# Patient Record
Sex: FEMALE | Race: WHITE | NOT HISPANIC OR LATINO | Employment: STUDENT | URBAN - METROPOLITAN AREA
[De-identification: names, ages, dates, MRNs, and addresses within clinical notes are randomized per-mention and may not be internally consistent; named-entity substitution may affect disease eponyms.]

---

## 2023-11-05 ENCOUNTER — HOSPITAL ENCOUNTER (EMERGENCY)
Facility: HOSPITAL | Age: 18
Discharge: HOME | End: 2023-11-05
Attending: EMERGENCY MEDICINE
Payer: MEDICAID

## 2023-11-05 VITALS
WEIGHT: 113.13 LBS | HEIGHT: 64 IN | RESPIRATION RATE: 16 BRPM | BODY MASS INDEX: 19.31 KG/M2 | SYSTOLIC BLOOD PRESSURE: 126 MMHG | DIASTOLIC BLOOD PRESSURE: 78 MMHG | OXYGEN SATURATION: 99 % | TEMPERATURE: 98 F | HEART RATE: 82 BPM

## 2023-11-05 DIAGNOSIS — S00.81XA ABRASION OF FACE, INITIAL ENCOUNTER: Primary | ICD-10-CM

## 2023-11-05 PROCEDURE — 25000003 PHARM REV CODE 250: Mod: ER | Performed by: NURSE PRACTITIONER

## 2023-11-05 PROCEDURE — 99283 EMERGENCY DEPT VISIT LOW MDM: CPT | Mod: ER

## 2023-11-05 RX ADMIN — BACITRACIN ZINC, NEOMYCIN, POLYMYXIN B 1 EACH: 400; 3.5; 5 OINTMENT TOPICAL at 01:11

## 2023-11-05 NOTE — ED PROVIDER NOTES
Encounter Date: 11/5/2023       History     Chief Complaint   Patient presents with    Laceration     Hit head with a door this morning. Laceration between eyebrows     Patient presents to ER for facial wound.  Patient states she accidentally ran into a door causing open facial wound to center of forehead.  She denies loss of consciousness.  She denies any other concerns.  She is unsure of her last Tdap.  She denies nasal pain, epistaxis, headache, visual changes, loss of vision, neck pain, numbness, tingling, weakness, fatigue, altered mental status, loss of consciousness.    The history is provided by the patient.     Review of patient's allergies indicates:  No Known Allergies  History reviewed. No pertinent past medical history.  History reviewed. No pertinent surgical history.  History reviewed. No pertinent family history.  Social History     Substance Use Topics    Alcohol use: Yes     Review of Systems   Constitutional:  Negative for chills, fatigue and fever.   HENT:  Negative for ear pain, nosebleeds, rhinorrhea, sinus pain and sore throat.    Eyes:  Negative for pain and visual disturbance.   Respiratory:  Negative for cough and shortness of breath.    Cardiovascular:  Negative for chest pain.   Gastrointestinal:  Negative for abdominal pain, nausea and vomiting.   Musculoskeletal:  Negative for back pain, myalgias and neck pain.   Skin:  Positive for wound. Negative for rash.   Neurological:  Negative for syncope, speech difficulty, weakness, numbness and headaches.   All other systems reviewed and are negative.      Physical Exam     Initial Vitals [11/05/23 1324]   BP Pulse Resp Temp SpO2   126/78 82 16 98.2 °F (36.8 °C) 99 %      MAP       --         Physical Exam    Nursing note and vitals reviewed.  Constitutional: She appears well-developed and well-nourished. She is not diaphoretic. No distress.   HENT:   Head: Normocephalic.       Right Ear: External ear normal.   Left Ear: External ear normal.    Nose: Nose normal.   Mouth/Throat: Oropharynx is clear and moist. No oropharyngeal exudate.   +superficial abrasion to center of forehead.  No active bleeding.  No crepitus.  No drainage.     Eyes: Conjunctivae and EOM are normal. Pupils are equal, round, and reactive to light.   Neck: Neck supple.   Normal range of motion.  Cardiovascular:  Normal rate, regular rhythm and intact distal pulses.           Pulmonary/Chest: Breath sounds normal. No respiratory distress.   Musculoskeletal:         General: Normal range of motion.      Cervical back: Normal range of motion and neck supple.     Neurological: She is alert and oriented to person, place, and time. She has normal strength. GCS score is 15. GCS eye subscore is 4. GCS verbal subscore is 5. GCS motor subscore is 6.   Skin: Skin is warm and dry. Capillary refill takes less than 2 seconds.         ED Course   Procedures  Labs Reviewed - No data to display       Imaging Results    None          Medications   neomycin-bacitracnZn-polymyxnB packet 1 each (1 each Topical (Top) Given 11/5/23 1346)     Medical Decision Making  Risk  OTC drugs.                Wound appears to be a superficial abrasion as opposed to a laceration.  Offered Dermabond, patient respectfully declines.  Wound edges approximate well without manipulation.  Patient unsure of her last Tdap, however, she  declines updating Tdap vaccine here in the ER today.  Discussed proper wound care at home.  Discussed signs and symptoms to return to ER.  Discussed outpatient follow-up with her PCP.  Patient agrees with plan and voices no further concerns.  I discussed wound care precautions with patient; specifically that all wounds have risk of infection despite efforts to cleanse and debride the wound; and there is a risk of an occult foreign body (and thus increased risk of infection) despite a negative examination.  I discussed with patient need to return for any signs of infection, specifically redness,  increased pain, fever, drainage of pus, or any concern, immediately.                 Clinical Impression:   Final diagnoses:  [S00.81XA] Abrasion of face, initial encounter (Primary)        ED Disposition Condition    Discharge Stable          ED Prescriptions       Medication Sig Dispense Start Date End Date Auth. Provider    neomycin-polymyxin-pramoxine (NEOSPORIN) 3.5-10,000-10 mg-unit-mg/gram Crea Apply topically 2 (two) times daily. for 10 days 1 each 11/5/2023 11/15/2023 Sean Colindres, ESCOBAR          Follow-up Information       Follow up With Specialties Details Why Contact Info    [place] -  In 2 days  [address]  [place] [address]  [phone]      [facility] - Emergency Dept Emergency Medicine  As needed, If symptoms worsen [address]  [place] [id number] [phone]             Sean Colindres, ESCOBAR  11/06/23 4495